# Patient Record
Sex: FEMALE | Race: WHITE
[De-identification: names, ages, dates, MRNs, and addresses within clinical notes are randomized per-mention and may not be internally consistent; named-entity substitution may affect disease eponyms.]

---

## 2018-03-13 NOTE — EKG
[place] [address]
Phone:  [phone]                     ELECTROCARDIOGRAM REPORT      
_______________________________________________________________________________
 
Name:       FALCONASHLYN                [facility]                      [facility]  [id number]      Account #:      [id number]  
Admission:  18     Attend Phys:                         
Discharge:  18     Date of Birth:  [birth year]  
         Report #: [id number]
    [id number]
_______________________________________________________________________________
THIS REPORT FOR:  //name//                      
 
                         [facility] ED
                                       
Test Date:    2018               Test Time:    18:41:45
Pat Name:     ASHLYN FALCON            Department:   
Patient ID:   [id number]            Room:          
Gender:       F                        Technician:   NEY LECHUGA
:          1985               Requested By: Theresa Bernabe
Order Number: [id number] MD:   Omar Del Real
                                 Measurements
Intervals                              Axis          
Rate:         85                       P:            61
VT:           151                      QRS:          50
QRSD:         92                       T:            58
QT:           393                                    
QTc:          468                                    
                           Interpretive Statements
Sinus rhythm
Baseline wander in lead(s) V3,V4,V5,V6
No previous ECG available for comparison
 
Electronically Signed On 3- 14:43:01 CDT by Omar Del Real
[email or web address]
 
 
 
 
 
 
 
 
 
 
 
 
 
 
 
 
 
 
 
  <ELECTRONICALLY SIGNED>
                                           By: Omar Del Real MD, [facility]   
  18     1443
D: 18   _____________________________________
T: 18   Omar Del Real MD, FACC     /EPI

## 2019-06-15 ENCOUNTER — HOSPITAL ENCOUNTER (EMERGENCY)
Dept: HOSPITAL 96 - M.ERS | Age: 34
Discharge: HOME | End: 2019-06-15
Payer: COMMERCIAL

## 2019-06-15 VITALS — HEIGHT: 68 IN | WEIGHT: 160.01 LBS | BODY MASS INDEX: 24.25 KG/M2

## 2019-06-15 VITALS — DIASTOLIC BLOOD PRESSURE: 67 MMHG | SYSTOLIC BLOOD PRESSURE: 116 MMHG

## 2019-06-15 DIAGNOSIS — R10.84: Primary | ICD-10-CM

## 2019-06-15 DIAGNOSIS — Z88.5: ICD-10-CM

## 2019-06-15 DIAGNOSIS — Z88.6: ICD-10-CM

## 2019-06-15 DIAGNOSIS — R11.2: ICD-10-CM

## 2019-06-15 DIAGNOSIS — Z90.49: ICD-10-CM

## 2019-06-15 DIAGNOSIS — F17.210: ICD-10-CM

## 2019-06-15 DIAGNOSIS — Z88.8: ICD-10-CM

## 2019-06-15 DIAGNOSIS — Z86.2: ICD-10-CM

## 2019-06-15 LAB
ABSOLUTE BASOPHILS: 0.1 THOU/UL (ref 0–0.2)
ABSOLUTE EOSINOPHILS: 0 THOU/UL (ref 0–0.7)
ABSOLUTE MONOCYTES: 0.4 THOU/UL (ref 0–1.2)
ALBUMIN SERPL-MCNC: 3.8 G/DL (ref 3.4–5)
ALP SERPL-CCNC: 73 U/L (ref 46–116)
ALT SERPL-CCNC: 25 U/L (ref 30–65)
ANION GAP SERPL CALC-SCNC: 8 MMOL/L (ref 7–16)
AST SERPL-CCNC: 20 U/L (ref 15–37)
BASOPHILS NFR BLD AUTO: 0.9 %
BILIRUB SERPL-MCNC: 0.6 MG/DL
BILIRUB UR-MCNC: NEGATIVE MG/DL
BUN SERPL-MCNC: 14 MG/DL (ref 7–18)
CALCIUM SERPL-MCNC: 9.3 MG/DL (ref 8.5–10.1)
CHLORIDE SERPL-SCNC: 101 MMOL/L (ref 98–107)
CO2 SERPL-SCNC: 32 MMOL/L (ref 21–32)
COLOR UR: YELLOW
CREAT SERPL-MCNC: 0.8 MG/DL (ref 0.6–1.3)
EOSINOPHIL NFR BLD: 0.7 %
GLUCOSE SERPL-MCNC: 91 MG/DL (ref 70–99)
GRANULOCYTES NFR BLD MANUAL: 55.9 %
HCT VFR BLD CALC: 42.3 % (ref 37–47)
HGB BLD-MCNC: 14.5 GM/DL (ref 12–15)
KETONES UR STRIP-MCNC: NEGATIVE MG/DL
LIPASE: 185 U/L (ref 73–393)
LYMPHOCYTES # BLD: 2.4 THOU/UL (ref 0.8–5.3)
LYMPHOCYTES NFR BLD AUTO: 36.9 %
MCH RBC QN AUTO: 33.8 PG (ref 26–34)
MCHC RBC AUTO-ENTMCNC: 34.2 G/DL (ref 28–37)
MCV RBC: 98.8 FL (ref 80–100)
MONOCYTES NFR BLD: 5.6 %
MPV: 8.9 FL. (ref 7.2–11.1)
NEUTROPHILS # BLD: 3.7 THOU/UL (ref 1.6–8.1)
NUCLEATED RBCS: 0 /100WBC
PLATELET COUNT*: 268 THOU/UL (ref 150–400)
POTASSIUM SERPL-SCNC: 3.2 MMOL/L (ref 3.5–5.1)
PROT SERPL-MCNC: 7.9 G/DL (ref 6.4–8.2)
PROT UR QL STRIP: NEGATIVE
RBC # BLD AUTO: 4.28 MIL/UL (ref 4.2–5)
RBC # UR STRIP: NEGATIVE /UL
RDW-CV: 13.7 % (ref 10.5–14.5)
SODIUM SERPL-SCNC: 141 MMOL/L (ref 136–145)
SP GR UR STRIP: 1.01 (ref 1–1.03)
URINE CLARITY: CLEAR
URINE GLUCOSE-RANDOM: NEGATIVE
URINE LEUKOCYTES-REFLEX: NEGATIVE
URINE NITRITE-REFLEX: NEGATIVE
UROBILINOGEN UR STRIP-ACNC: 0.2 E.U./DL (ref 0.2–1)
WBC # BLD AUTO: 6.5 THOU/UL (ref 4–11)

## 2019-09-14 ENCOUNTER — HOSPITAL ENCOUNTER (EMERGENCY)
Dept: HOSPITAL 96 - M.ERS | Age: 34
Discharge: HOME | End: 2019-09-14
Payer: COMMERCIAL

## 2019-09-14 VITALS — WEIGHT: 145 LBS | HEIGHT: 68 IN | BODY MASS INDEX: 21.98 KG/M2

## 2019-09-14 DIAGNOSIS — S30.0XXA: Primary | ICD-10-CM
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
## 2019-09-14 LAB
BILIRUB UR-MCNC: (no result) MG/DL
COLOR UR: (no result)
KETONES UR STRIP-MCNC: (no result) MG/DL
PROT UR QL STRIP: (no result)
RBC # UR STRIP: NEGATIVE /UL
SP GR UR STRIP: >= 1.03 (ref 1–1.03)
URINE CLARITY: CLEAR
URINE GLUCOSE-RANDOM: NEGATIVE
URINE LEUKOCYTES-REFLEX: NEGATIVE
URINE NITRITE-REFLEX: NEGATIVE
UROBILINOGEN UR STRIP-ACNC: 1 E.U./DL (ref 0.2–1)

## 2019-09-15 VITALS — SYSTOLIC BLOOD PRESSURE: 128 MMHG | DIASTOLIC BLOOD PRESSURE: 76 MMHG

## 2020-06-15 ENCOUNTER — HOSPITAL ENCOUNTER (EMERGENCY)
Dept: HOSPITAL 96 - M.ERS | Age: 35
Discharge: HOME | End: 2020-06-15
Payer: COMMERCIAL

## 2020-06-15 VITALS — SYSTOLIC BLOOD PRESSURE: 110 MMHG | DIASTOLIC BLOOD PRESSURE: 70 MMHG

## 2020-06-15 VITALS — WEIGHT: 155.01 LBS | BODY MASS INDEX: 23.49 KG/M2 | HEIGHT: 68 IN

## 2020-06-15 DIAGNOSIS — Z90.49: ICD-10-CM
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
## 2020-06-15 DIAGNOSIS — Z86.2: ICD-10-CM

## 2020-06-15 DIAGNOSIS — Z88.6: ICD-10-CM

## 2020-06-15 DIAGNOSIS — S30.0XXA: Primary | ICD-10-CM
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
## 2020-06-15 DIAGNOSIS — Z88.8: ICD-10-CM

## 2020-06-30 ENCOUNTER — HOSPITAL ENCOUNTER (INPATIENT)
Dept: HOSPITAL 96 - M.ERS | Age: 35
LOS: 1 days | Discharge: LEFT BEFORE DISCHARGE | DRG: 439 | End: 2020-07-01
Attending: INTERNAL MEDICINE | Admitting: INTERNAL MEDICINE
Payer: COMMERCIAL

## 2020-06-30 VITALS — WEIGHT: 168.41 LBS | BODY MASS INDEX: 25.52 KG/M2 | HEIGHT: 67.99 IN

## 2020-06-30 VITALS — DIASTOLIC BLOOD PRESSURE: 74 MMHG | SYSTOLIC BLOOD PRESSURE: 132 MMHG

## 2020-06-30 VITALS — DIASTOLIC BLOOD PRESSURE: 59 MMHG | SYSTOLIC BLOOD PRESSURE: 120 MMHG

## 2020-06-30 DIAGNOSIS — F10.230: ICD-10-CM

## 2020-06-30 DIAGNOSIS — E07.9: ICD-10-CM

## 2020-06-30 DIAGNOSIS — Z90.49: ICD-10-CM

## 2020-06-30 DIAGNOSIS — Z88.6: ICD-10-CM

## 2020-06-30 DIAGNOSIS — F12.90: ICD-10-CM

## 2020-06-30 DIAGNOSIS — R65.10: ICD-10-CM

## 2020-06-30 DIAGNOSIS — F41.9: ICD-10-CM

## 2020-06-30 DIAGNOSIS — K85.90: Primary | ICD-10-CM

## 2020-06-30 DIAGNOSIS — Z53.29: ICD-10-CM

## 2020-06-30 DIAGNOSIS — Z88.8: ICD-10-CM

## 2020-06-30 LAB
ABSOLUTE BASOPHILS: 0 THOU/UL (ref 0–0.2)
ABSOLUTE EOSINOPHILS: 0 THOU/UL (ref 0–0.7)
ABSOLUTE MONOCYTES: 0.3 THOU/UL (ref 0–1.2)
ALBUMIN SERPL-MCNC: 4.3 G/DL (ref 3.4–5)
ALP SERPL-CCNC: 122 U/L (ref 46–116)
ALT SERPL-CCNC: 57 U/L (ref 30–65)
ANION GAP SERPL CALC-SCNC: 20 MMOL/L (ref 7–16)
AST SERPL-CCNC: 63 U/L (ref 15–37)
BASOPHILS NFR BLD AUTO: 0.3 %
BILIRUB SERPL-MCNC: 0.6 MG/DL
BILIRUB UR-MCNC: NEGATIVE MG/DL
BUN SERPL-MCNC: 12 MG/DL (ref 7–18)
CALCIUM SERPL-MCNC: 9.6 MG/DL (ref 8.5–10.1)
CHLORIDE SERPL-SCNC: 95 MMOL/L (ref 98–107)
CO2 SERPL-SCNC: 22 MMOL/L (ref 21–32)
COLOR UR: YELLOW
CREAT SERPL-MCNC: 0.9 MG/DL (ref 0.6–1.3)
EOSINOPHIL NFR BLD: 0 %
GLUCOSE SERPL-MCNC: 83 MG/DL (ref 70–99)
GRANULOCYTES NFR BLD MANUAL: 81.2 %
HCT VFR BLD CALC: 47.6 % (ref 37–47)
HGB BLD-MCNC: 16.6 GM/DL (ref 12–15)
KETONES UR STRIP-MCNC: (no result) MG/DL
LIPASE: 597 U/L (ref 73–393)
LYMPHOCYTES # BLD: 1.3 THOU/UL (ref 0.8–5.3)
LYMPHOCYTES NFR BLD AUTO: 15.5 %
MAGNESIUM SERPL-MCNC: 1.8 MG/DL (ref 1.8–2.4)
MCH RBC QN AUTO: 35.8 PG (ref 26–34)
MCHC RBC AUTO-ENTMCNC: 34.9 G/DL (ref 28–37)
MCV RBC: 102.5 FL (ref 80–100)
MONOCYTES NFR BLD: 3 %
MPV: 8.2 FL. (ref 7.2–11.1)
NEUTROPHILS # BLD: 6.7 THOU/UL (ref 1.6–8.1)
NUCLEATED RBCS: 0 /100WBC
PHOSPHATE SERPL-MCNC: 4.9 MG/DL (ref 2.5–4.9)
PLATELET COUNT*: 312 THOU/UL (ref 150–400)
POTASSIUM SERPL-SCNC: 3.6 MMOL/L (ref 3.5–5.1)
PROT SERPL-MCNC: 8.5 G/DL (ref 6.4–8.2)
PROT UR QL STRIP: NEGATIVE
RBC # BLD AUTO: 4.64 MIL/UL (ref 4.2–5)
RBC # UR STRIP: (no result) /UL
RDW-CV: 13.3 % (ref 10.5–14.5)
SODIUM SERPL-SCNC: 137 MMOL/L (ref 136–145)
SP GR UR STRIP: >= 1.03 (ref 1–1.03)
THC: POSITIVE
URINE CLARITY: CLEAR
URINE GLUCOSE-RANDOM: NEGATIVE
URINE LEUKOCYTES-REFLEX: NEGATIVE
URINE NITRITE-REFLEX: NEGATIVE
UROBILINOGEN UR STRIP-ACNC: 0.2 E.U./DL (ref 0.2–1)
WBC # BLD AUTO: 8.3 THOU/UL (ref 4–11)

## 2020-07-01 VITALS — SYSTOLIC BLOOD PRESSURE: 109 MMHG | DIASTOLIC BLOOD PRESSURE: 70 MMHG

## 2020-07-01 VITALS — DIASTOLIC BLOOD PRESSURE: 71 MMHG | SYSTOLIC BLOOD PRESSURE: 115 MMHG

## 2020-07-01 VITALS — SYSTOLIC BLOOD PRESSURE: 126 MMHG | DIASTOLIC BLOOD PRESSURE: 78 MMHG

## 2020-07-01 VITALS — SYSTOLIC BLOOD PRESSURE: 119 MMHG | DIASTOLIC BLOOD PRESSURE: 81 MMHG

## 2020-07-01 VITALS — DIASTOLIC BLOOD PRESSURE: 72 MMHG | SYSTOLIC BLOOD PRESSURE: 118 MMHG

## 2020-07-01 NOTE — NUR
Pt is A&O. Resides at home with her SO and kids. Independent. No DME. No hx of
HH or SNF. Pt informed that she has Home State insurance, CM let pre-cert
know. Pt's PCP is Dr Darion Cuevas [phone]. Goal [place].
Following.

## 2020-07-01 NOTE — EKG
[place] [address]
Phone:  [phone]                     ELECTROCARDIOGRAM REPORT      
_______________________________________________________________________________
 
Name:         FALCONASHLYN JO              Room:          [address]    ADM IN 
M.R.#:    [id number]     Account #:     [id number]  
Admission:    20    Attend Phys:   John Alfred, 
Discharge:                Date of Birth: [birth year]  
Date of Service: 20 1737  Report #:      [id number]
        [id number]
_______________________________________________________________________________
THIS REPORT FOR:  //name//                      
 
                         [facility] ED
                                       
Test Date:    2020               Test Time:    17:37:03
Pat Name:     ASHLYN FALCON            Department:   
Patient ID:   [id number]            Room:         [facility]
Gender:       F                        Technician:   LAKE
:          1985               Requested By: Johanne Yap
Order Number: [id number] MD:   Corbin Holly
                                 Measurements
Intervals                              Axis          
Rate:         132                      P:            47
PA:           160                      QRS:          264
QRSD:         138                      T:            -55
QT:           387                                    
QTc:          574                                    
                           Interpretive Statements
Sinus tachycardia
 
Nonspecific IVCD with LAD
Borderline T abnormalities, diffuse leads
Baseline wander in lead(s) II,aVR,aVF
Compared to ECG 2018 18:41:45
 
T-wave abnormality now present
Sinus rhythm no longer present
Electronically Signed On 2020 11:35:56 CDT by Corbin Holly
[email or web address]
 
 
 
 
 
 
 
 
 
 
 
 
 
 
 
  <ELECTRONICALLY SIGNED>
                                           By: Corbin Holly MD, [facility]      
  20     1135
D: 20 1737   _____________________________________
T: 20 1737   Corbin Holly MD, [facility]        /EPI

## 2020-07-01 NOTE — NUR
PT ADMITTED TO ROOM 219 DURING NIGHT SHIFT; VSS, ABD PAIN, IV FLUIDS RUNNING,
PT UP SBA D/T REPORTED HX OF FALL AT HOME, ROOM AIR, REPORTS SOME NAUSEA.
PT IS ABLE TO COMMUNICATE HER NEEDS TO STAFF EFFECTIVELY. CURRENT PAIN
MEDICATION REGIMEN HAS BEEN ADEQUATE FOR CONTROLLING HER PAIN UP TO THIS TIME.
CURRENTLY ORDERED NAUSEA MEDICATION HAS BEEN ADEQUATE FOR CONTROLLING HER
NAUSEA UP TO THIS TIME; PT DENIES VOMITING WHILE IN HOSPITAL. SHE HAS BEEN
NPO, EXCEPT FOR SOME ICE CHIPS, SINCE MIDNIGHT.

## 2020-07-02 NOTE — NUR
PT LEFT HOSPITAL AMA AT APPROXIMATELY 22:05 ON 07/01/2020. WRITER RECEIVED
NOTIFICATION THAT PT WAS HAVING VISUAL AND TACTILE HALLUCINATIONS, WHICH SHE
HAD PREVIOUSLY DENIED, AND THAT SHE HAD REMOVED HER IV. WRITER WENT TO GET PT
AN ATIVAN DOSE, BUT PT HAD PACKED UP HER BELONGINGS AND RAN DOWN [place] AND
EXITED THE BULIDING DOWN BY PHARMACY WINDOW ON FIRST FLOOR. SECURITY WAS
NOTIFIED IMMEDIATELY AND REPORTED SEEING HER LEAVE THE PROPERTY; THEY
PROMPTLY NOTIFIED [employer].

## 2021-10-27 ENCOUNTER — HOSPITAL ENCOUNTER (EMERGENCY)
Dept: HOSPITAL 96 - M.ERS | Age: 36
Discharge: HOME | End: 2021-10-27
Payer: COMMERCIAL

## 2021-10-27 VITALS — SYSTOLIC BLOOD PRESSURE: 116 MMHG | DIASTOLIC BLOOD PRESSURE: 77 MMHG

## 2021-10-27 VITALS — BODY MASS INDEX: 21.98 KG/M2 | WEIGHT: 145 LBS | HEIGHT: 68 IN

## 2021-10-27 DIAGNOSIS — K29.00: ICD-10-CM

## 2021-10-27 DIAGNOSIS — E87.6: ICD-10-CM

## 2021-10-27 DIAGNOSIS — Z88.5: ICD-10-CM

## 2021-10-27 DIAGNOSIS — F19.10: ICD-10-CM

## 2021-10-27 DIAGNOSIS — K70.10: ICD-10-CM

## 2021-10-27 DIAGNOSIS — Z90.89: ICD-10-CM

## 2021-10-27 DIAGNOSIS — N39.0: Primary | ICD-10-CM

## 2021-10-27 DIAGNOSIS — Z90.49: ICD-10-CM

## 2021-10-27 DIAGNOSIS — Z88.6: ICD-10-CM

## 2021-10-27 LAB
ABSOLUTE BASOPHILS: 0.1 THOU/UL (ref 0–0.2)
ABSOLUTE EOSINOPHILS: 0 THOU/UL (ref 0–0.7)
ABSOLUTE MONOCYTES: 0.5 THOU/UL (ref 0–1.2)
ALBUMIN SERPL-MCNC: 3.6 G/DL (ref 3.4–5)
ALP SERPL-CCNC: 158 U/L (ref 46–116)
ALT SERPL-CCNC: 69 U/L (ref 30–65)
AMP/METHAMP: POSITIVE
ANION GAP SERPL CALC-SCNC: 11 MMOL/L (ref 7–16)
AST SERPL-CCNC: 113 U/L (ref 15–37)
BACTERIA-REFLEX: (no result) /HPF
BASOPHILS NFR BLD AUTO: 1.3 %
BENZODIAZ UR-MCNC: POSITIVE UG/L
BILIRUB SERPL-MCNC: 0.6 MG/DL
BILIRUB UR-MCNC: NEGATIVE MG/DL
BUN SERPL-MCNC: 6 MG/DL (ref 7–18)
CALCIUM SERPL-MCNC: 8.7 MG/DL (ref 8.5–10.1)
CHLORIDE SERPL-SCNC: 105 MMOL/L (ref 98–107)
CO2 SERPL-SCNC: 29 MMOL/L (ref 21–32)
COLOR UR: (no result)
CREAT SERPL-MCNC: 0.7 MG/DL (ref 0.6–1.3)
EOSINOPHIL NFR BLD: 0.8 %
GLUCOSE SERPL-MCNC: 111 MG/DL (ref 70–99)
GRANULOCYTES NFR BLD MANUAL: 39.8 %
HCT VFR BLD CALC: 43.2 % (ref 37–47)
HGB BLD-MCNC: 15 GM/DL (ref 12–15)
KETONES UR STRIP-MCNC: NEGATIVE MG/DL
LIPASE: 76 U/L (ref 73–393)
LYMPHOCYTES # BLD: 2 THOU/UL (ref 0.8–5.3)
LYMPHOCYTES NFR BLD AUTO: 47.2 %
MACROCYTES: (no result)
MCH RBC QN AUTO: 36.5 PG (ref 26–34)
MCHC RBC AUTO-ENTMCNC: 34.6 G/DL (ref 28–37)
MCV RBC: 105.5 FL (ref 80–100)
MONOCYTES NFR BLD: 10.9 %
MPV: 8.2 FL. (ref 7.2–11.1)
NEUTROPHILS # BLD: 1.7 THOU/UL (ref 1.6–8.1)
NUCLEATED RBCS: 0 /100WBC
OPIATES UR-MCNC: POSITIVE NG/ML
PLATELET # BLD EST: ADEQUATE 10*3/UL
PLATELET COUNT*: 192 THOU/UL (ref 150–400)
POTASSIUM SERPL-SCNC: 3 MMOL/L (ref 3.5–5.1)
PROT SERPL-MCNC: 7.3 G/DL (ref 6.4–8.2)
PROT UR QL STRIP: NEGATIVE
RBC # BLD AUTO: 4.1 MIL/UL (ref 4.2–5)
RBC # UR STRIP: (no result) /UL
RBC #/AREA URNS HPF: (no result) /HPF (ref 0–2)
RDW-CV: 14.1 % (ref 10.5–14.5)
SODIUM SERPL-SCNC: 145 MMOL/L (ref 136–145)
SP GR UR STRIP: 1.02 (ref 1–1.03)
SQUAMOUS: (no result) /LPF (ref 0–3)
THC: POSITIVE
URINE CLARITY: (no result)
URINE GLUCOSE-RANDOM: NEGATIVE
URINE LEUKOCYTES-REFLEX: (no result)
URINE NITRITE-REFLEX: POSITIVE
URINE WBC-REFLEX: (no result) /HPF (ref 0–5)
UROBILINOGEN UR STRIP-ACNC: 0.2 E.U./DL (ref 0.2–1)
WBC # BLD AUTO: 4.2 THOU/UL (ref 4–11)

## 2021-10-27 NOTE — EKG
[place] [address]
Phone:  [phone]                     ELECTROCARDIOGRAM REPORT      
_______________________________________________________________________________
 
Name:         ASHLYN FALCON MARIAN              Room:                     [facility]#:    [id number]     Account #:     [id number]  
Admission:    10/27/21    Attend Phys:                     
Discharge:    10/27/21    Date of Birth: [birth year]  
Date of Service: 10/27/21 0229  Report #:      [id number]
        [id number]
_______________________________________________________________________________
THIS REPORT FOR:  //name//                      
 
                         [facility] ED
                                       
Test Date:    2021-10-27               Test Time:    02:29:47
Pat Name:     ASHLYN FALCON            Department:   
Patient ID:   [id number]            Room:          
Gender:                               Technician:   SUSHILA
:          1985               Requested By: Marilyn Herman
Order Number: [id number] MD:   Omar Del Real
                                 Measurements
Intervals                              Axis          
Rate:         91                       P:            70
LA:           163                      QRS:          48
QRSD:         92                       T:            64
QT:           400                                    
QTc:          493                                    
                           Interpretive Statements
Sinus rhythm
Compared to ECG 2020 17:37:03
Sinus tachycardia no longer present
T-wave abnormality no longer present
Electronically Signed On 10- 6:21:59 CDT by Omar Del Real
[email or web address]
 
 
 
 
 
 
 
 
 
 
 
 
 
 
 
 
 
 
 
 
  <ELECTRONICALLY SIGNED>
                                           By: Omar Del Real MD, FACC   
  10/27/21     0621
D: 10/27/21 0229   _____________________________________
T: 10/27/21 0229   Omar Del Real MD, FACC     /EPI